# Patient Record
Sex: MALE | Race: WHITE | HISPANIC OR LATINO
[De-identification: names, ages, dates, MRNs, and addresses within clinical notes are randomized per-mention and may not be internally consistent; named-entity substitution may affect disease eponyms.]

---

## 2022-09-19 ENCOUNTER — APPOINTMENT (OUTPATIENT)
Dept: CARDIOLOGY | Facility: CLINIC | Age: 67
End: 2022-09-19

## 2022-09-23 ENCOUNTER — APPOINTMENT (OUTPATIENT)
Dept: CARDIOLOGY | Facility: CLINIC | Age: 67
End: 2022-09-23

## 2022-09-23 VITALS — BODY MASS INDEX: 31.85 KG/M2 | HEIGHT: 65 IN | WEIGHT: 191.13 LBS

## 2022-09-23 PROCEDURE — 99205 OFFICE O/P NEW HI 60 MIN: CPT

## 2022-09-23 NOTE — DISCUSSION/SUMMARY
[FreeTextEntry1] : pt hr is slow asymptomatic \par get monitor \par get echo \par stop lisinopril and increase valsartan.

## 2022-09-23 NOTE — HISTORY OF PRESENT ILLNESS
[FreeTextEntry1] : PT with HTN, HLD, HYPOTHYROID, PREDM, 10 YEAR ASCVD IS 29% IN 2022\par \par pt went ot see PMD and hr was bradycardic at times, pt was 178 and patient not symptomatic but hr to 43 bpm with normal pr interval \par ekg reviewed. \par pt was not dizzy or sob. \par pt f/u with dr watson in [place] and medications are changing. \par reviewed all meds, \par Lipid normal profile.

## 2022-09-28 ENCOUNTER — NON-APPOINTMENT (OUTPATIENT)
Age: 67
End: 2022-09-28

## 2022-09-28 DIAGNOSIS — Z82.3 FAMILY HISTORY OF STROKE: ICD-10-CM

## 2022-09-28 DIAGNOSIS — Z82.49 FAMILY HISTORY OF ISCHEMIC HEART DISEASE AND OTHER DISEASES OF THE CIRCULATORY SYSTEM: ICD-10-CM

## 2022-10-14 ENCOUNTER — APPOINTMENT (OUTPATIENT)
Dept: CARDIOLOGY | Facility: CLINIC | Age: 67
End: 2022-10-14

## 2022-10-14 PROCEDURE — 93306 TTE W/DOPPLER COMPLETE: CPT

## 2022-10-28 ENCOUNTER — APPOINTMENT (OUTPATIENT)
Dept: CARDIOLOGY | Facility: CLINIC | Age: 67
End: 2022-10-28

## 2022-10-28 VITALS — WEIGHT: 192.44 LBS | HEIGHT: 65 IN | BODY MASS INDEX: 32.06 KG/M2

## 2022-10-28 PROCEDURE — 99214 OFFICE O/P EST MOD 30 MIN: CPT

## 2022-10-28 RX ORDER — LISINOPRIL 10 MG/1
10 TABLET ORAL DAILY
Refills: 0 | Status: DISCONTINUED | COMMUNITY
End: 2022-10-28

## 2022-10-28 NOTE — HISTORY OF PRESENT ILLNESS
[FreeTextEntry1] : PT with HTN, HLD, HYPOTHYROID, PREDM, 10 YEAR ASCVD IS 29% IN 2022, borderline LVH \par \par pt went ot see PMD and hr was bradycardic at times, pt was 178 and patient not symptomatic but hr to 43 bpm with normal pr interval \par ekg reviewed. \par pt was not dizzy or sob. \par pt f/u with dr watson in [place] and medications are changing. \par reviewed all meds, \par Lipid normal profile. \par \par pt monitor average hr 62 bpm, pt says rarely dizzy but does not seem to be cardiac, pt had one episode of dizziness with monitor but no abn.

## 2022-10-28 NOTE — DISCUSSION/SUMMARY
[FreeTextEntry1] : pt hr is slow asymptomatic \par stop lisinopril and increase valsartan. to 160 \par \par pt bp controlled has some borderline lvh, salt restriction < 1500 mg daily \par f/u in 6 months pt advised to exercise \par get bloodwork \par HIGH 10 YEAR RISK WILL CALCULATE AT NEXT VISIT  none

## 2022-12-30 ENCOUNTER — NON-APPOINTMENT (OUTPATIENT)
Age: 67
End: 2022-12-30

## 2023-04-28 ENCOUNTER — APPOINTMENT (OUTPATIENT)
Dept: CARDIOLOGY | Facility: CLINIC | Age: 68
End: 2023-04-28
Payer: COMMERCIAL

## 2023-04-28 VITALS — HEART RATE: 80 BPM | SYSTOLIC BLOOD PRESSURE: 155 MMHG | DIASTOLIC BLOOD PRESSURE: 95 MMHG

## 2023-04-28 VITALS — BODY MASS INDEX: 32.49 KG/M2 | WEIGHT: 195 LBS | HEIGHT: 65 IN

## 2023-04-28 DIAGNOSIS — R07.89 OTHER CHEST PAIN: ICD-10-CM

## 2023-04-28 PROCEDURE — 99214 OFFICE O/P EST MOD 30 MIN: CPT

## 2023-04-28 RX ORDER — VALSARTAN 80 MG/1
80 TABLET, COATED ORAL
Refills: 0 | Status: DISCONTINUED | COMMUNITY
End: 2023-04-28

## 2023-04-28 RX ORDER — ROSUVASTATIN CALCIUM 20 MG/1
20 TABLET, FILM COATED ORAL DAILY
Qty: 90 | Refills: 3 | Status: DISCONTINUED | COMMUNITY
End: 2023-04-28

## 2023-04-28 RX ORDER — SIMVASTATIN 10 MG/1
10 TABLET, FILM COATED ORAL DAILY
Refills: 0 | Status: DISCONTINUED | COMMUNITY
End: 2023-04-28

## 2023-04-28 NOTE — PHYSICAL EXAM
[Normal] : normal S1, S2, no murmur, no rub, no gallop [Clear Lung Fields] : clear lung fields [Soft] : abdomen soft [No Edema] : no edema [de-identified] : rrr

## 2023-04-28 NOTE — HISTORY OF PRESENT ILLNESS
[FreeTextEntry1] : PT with HTN, HLD, HYPOTHYROID, PREDM, 10 YEAR ASCVD IS 29% IN 2022, borderline LVH DILATED AO 3.9 cm. \par \par pt went ot see PMD and hr was bradycardic at times, pt was 178 and patient not symptomatic but hr to 43 bpm with normal pr interval \par ekg reviewed. \par pt was not dizzy or sob. \par pt f/u with dr watson in [place] and medications are changing. \par reviewed all meds, \par Lipid normal profile. \par \par pt monitor average hr 62 bpm, pt says rarely dizzy but does not seem to be cardiac, pt had one episode of dizziness with monitor but no abn. \par \par 4/28/23: daughter translating. \par 10/14/22: lvef 60%, DD1, mild mr dilated aorta 3.9 CM BORDERLINE LVH \par pt complains of pain in his ankles and c/o of pain in middle thigh with lifting intermittently, pt denies claudication, pt c/o's of cp on left side intermittently for a few days and no sob, no n/v and diaphoresis. \par pt had bloodwork at  office will do before our next visit.pt says bp high at home.

## 2023-04-28 NOTE — DISCUSSION/SUMMARY
[FreeTextEntry1] : pt hr is slow asymptomatic \par stop lisinopril and increase valsartan. to 160 \par \par pt bp controlled has some borderline lvh, salt restriction < 1500 mg daily \par f/u in 6 months pt advised to exercise \par get bloodwork \par HIGH 10 YEAR RISK WILL CALCULATE AT NEXT VISIT \par \par 4/28/23: \par repeat bp is 138/94, pt does not have list of meds and needst to update \par will stop simvastatin \par increase hctz to 25 mg po qd \par cp will get stress nuclear as cannot exercise

## 2023-07-24 NOTE — PHYSICAL EXAM
[Normal] : normal S1, S2, no murmur, no rub, no gallop [Clear Lung Fields] : clear lung fields [Soft] : abdomen soft [No Edema] : no edema [de-identified] : rrr

## 2023-07-24 NOTE — HISTORY OF PRESENT ILLNESS
[FreeTextEntry1] : PT with HTN, HLD, HYPOTHYROID, PREDM, 10 YEAR ASCVD IS 29% IN 2022, borderline LVH DILATED AO 3.9 cm. \par \par pt went ot see PMD and hr was bradycardic at times, pt was 178 and patient not symptomatic but hr to 43 bpm with normal pr interval \par ekg reviewed. \par pt was not dizzy or sob. \par pt f/u with dr watson in [place] and medications are changing. \par reviewed all meds, \par Lipid normal profile. \par \par pt monitor average hr 62 bpm, pt says rarely dizzy but does not seem to be cardiac, pt had one episode of dizziness with monitor but no abn. \par \par 4/28/23: daughter translating. \par 10/14/22: lvef 60%, DD1, mild mr dilated aorta 3.9 CM BORDERLINE LVH \par pt complains of pain in his ankles and c/o of pain in middle thigh with lifting intermittently, pt denies claudication, pt c/o's of cp on left side intermittently for a few days and no sob, no n/v and diaphoresis. \par pt had bloodwork at  office will do before our next visit.pt says bp high at home. \par \par 7/25/23:\par stress nuclear?

## 2023-07-25 ENCOUNTER — APPOINTMENT (OUTPATIENT)
Dept: CARDIOLOGY | Facility: CLINIC | Age: 68
End: 2023-07-25

## 2024-03-07 PROBLEM — R42 DIZZINESS: Status: ACTIVE | Noted: 2022-09-23

## 2024-03-08 ENCOUNTER — APPOINTMENT (OUTPATIENT)
Dept: CARDIOLOGY | Facility: CLINIC | Age: 69
End: 2024-03-08
Payer: MEDICARE

## 2024-03-08 VITALS — WEIGHT: 196 LBS | HEIGHT: 65 IN | BODY MASS INDEX: 32.65 KG/M2

## 2024-03-08 VITALS — SYSTOLIC BLOOD PRESSURE: 150 MMHG | DIASTOLIC BLOOD PRESSURE: 100 MMHG

## 2024-03-08 DIAGNOSIS — R42 DIZZINESS AND GIDDINESS: ICD-10-CM

## 2024-03-08 DIAGNOSIS — Z00.00 ENCOUNTER FOR GENERAL ADULT MEDICAL EXAMINATION W/OUT ABNORMAL FINDINGS: ICD-10-CM

## 2024-03-08 PROCEDURE — 93000 ELECTROCARDIOGRAM COMPLETE: CPT

## 2024-03-08 PROCEDURE — 99214 OFFICE O/P EST MOD 30 MIN: CPT | Mod: 25

## 2024-03-08 RX ORDER — ROSUVASTATIN CALCIUM 40 MG/1
40 TABLET, FILM COATED ORAL
Refills: 0 | Status: ACTIVE | COMMUNITY
Start: 2024-03-08

## 2024-03-08 RX ORDER — OMEPRAZOLE 40 MG/1
40 CAPSULE, DELAYED RELEASE ORAL
Refills: 0 | Status: ACTIVE | COMMUNITY
Start: 2024-03-08

## 2024-03-08 RX ORDER — LEVOTHYROXINE SODIUM 0.05 MG/1
50 TABLET ORAL
Refills: 0 | Status: ACTIVE | COMMUNITY
Start: 2024-03-08

## 2024-03-08 RX ORDER — NIFEDIPINE 60 MG/1
60 TABLET, FILM COATED, EXTENDED RELEASE ORAL
Refills: 0 | Status: DISCONTINUED | COMMUNITY
End: 2024-03-08

## 2024-03-12 RX ORDER — NIFEDIPINE 60 MG/1
60 TABLET, EXTENDED RELEASE ORAL
Qty: 90 | Refills: 3 | Status: ACTIVE | COMMUNITY
Start: 2024-03-08 | End: 1900-01-01

## 2024-03-18 LAB
ALBUMIN SERPL ELPH-MCNC: 4.6 G/DL
ALP BLD-CCNC: 60 U/L
ALT SERPL-CCNC: 20 U/L
ANION GAP SERPL CALC-SCNC: 12 MMOL/L
AST SERPL-CCNC: 16 U/L
BILIRUB SERPL-MCNC: 0.6 MG/DL
BUN SERPL-MCNC: 18 MG/DL
CALCIUM SERPL-MCNC: 9.5 MG/DL
CHLORIDE SERPL-SCNC: 103 MMOL/L
CHOLEST SERPL-MCNC: 152 MG/DL
CO2 SERPL-SCNC: 25 MMOL/L
CREAT SERPL-MCNC: 1.14 MG/DL
EGFR: 71 ML/MIN/1.73M2
ESTIMATED AVERAGE GLUCOSE: 123 MG/DL
GLUCOSE SERPL-MCNC: 103 MG/DL
HBA1C MFR BLD HPLC: 5.9 %
HCT VFR BLD CALC: 45.1 %
HDLC SERPL-MCNC: 54 MG/DL
HGB BLD-MCNC: 14.6 G/DL
LDLC SERPL CALC-MCNC: 83 MG/DL
MCHC RBC-ENTMCNC: 28.6 PG
MCHC RBC-ENTMCNC: 32.4 GM/DL
MCV RBC AUTO: 88.3 FL
NONHDLC SERPL-MCNC: 98 MG/DL
NT-PROBNP SERPL-MCNC: 43 PG/ML
PLATELET # BLD AUTO: 239 K/UL
POTASSIUM SERPL-SCNC: 4.4 MMOL/L
PROT SERPL-MCNC: 6.7 G/DL
RBC # BLD: 5.11 M/UL
RBC # FLD: 14 %
SODIUM SERPL-SCNC: 140 MMOL/L
TRIGL SERPL-MCNC: 79 MG/DL
TSH SERPL-ACNC: 7.06 UIU/ML
WBC # FLD AUTO: 7.98 K/UL

## 2024-03-21 NOTE — PHYSICAL EXAM
[Normal] : normal S1, S2, no murmur, no rub, no gallop [Clear Lung Fields] : clear lung fields [Soft] : abdomen soft [No Edema] : no edema [de-identified] : rrr

## 2024-03-21 NOTE — CARDIOLOGY SUMMARY
[de-identified] : 3/8/24: sinus bradycardia with nsst changes.  [de-identified] : 10/14/22: lvef 60%, DD1, mild mr dilated aorta 3.9 CM BORDERLINE LVH

## 2024-03-21 NOTE — DISCUSSION/SUMMARY
[EKG obtained to assist in diagnosis and management of assessed problem(s)] : EKG obtained to assist in diagnosis and management of assessed problem(s) [FreeTextEntry1] : pt hr is slow asymptomatic  cont valsartan. to 160 2023 pt bp controlled has some borderline lvh, salt restriction < 1500 mg daily  HIGH 10 YEAR RISK WILL CALCULATE AT NEXT VISIT  2023 continue rousvastatin  stop hctz as frequent urination but may not be cause.  start nifedipine 90 mg  get 2 d echo  schedule CTA  f/u in 1 month get bloodwork

## 2024-03-21 NOTE — HISTORY OF PRESENT ILLNESS
[FreeTextEntry1] : PT with HTN, HLD, borderline LVH DILATED AO 3.9 cm. 10 YEAR ASCVD IS 29% , HYPOTHYROID, PREDM,  [facility] bradycardic   pt went ot see PMD and hr was bradycardic at times, pt was 178 and patient not symptomatic but hr to 43 bpm with normal pr interval  ekg reviewed.  pt was not dizzy or sob.  pt f/u with dr watson in [place] and medications are changing.  reviewed all meds,  Lipid normal profile.   pt monitor average hr 62 bpm, pt says rarely dizzy but does not seem to be cardiac, pt had one episode of dizziness with monitor but no abn.   23: daughter translating.  10/14/22: lvef 60%, DD1, mild mr dilated aorta 3.9 CM BORDERLINE LVH  pt complains of pain in his ankles and c/o of pain in middle thigh with lifting intermittently, pt denies claudication, pt c/o's of cp on left side intermittently for a few days and no sob, no n/v and diaphoresis.  pt had bloodwork at  office will do before our next visit.pt says bp high at home.   3/8/24: had chest pain intemittent  here after one year did not do stress test.  pt says has chest pressure lasting a short time 2 to 3 times/week. pt was suppose to get stress test and had insurance issues and went back to . reviewed all medications.  no lipids, stopped simvastatin last visit. and onr rosuvastatin  pt complains of frequent urination and has been on hctz for awhile. pt denies syncope/palpiations, pt works as a heavy .  pt says bp is ok at home.   3/12/24: Telephone call with NP: Patient's daughter called states patient started increased dose of Nifedipine on Saturday. On  patient was having dinner felt flushing/sweating and patient wasn't feeling well. On Monday patient went to work and felt the same after taking the medication. Patient advised to decrease medication back down to 60mg to see if symptoms improve.   24: Echo before appointment.  3/16/24: A1C: 5.9, LDL: 83, T, HDL: 54, BNP: 43

## 2024-04-10 ENCOUNTER — OUTPATIENT (OUTPATIENT)
Dept: OUTPATIENT SERVICES | Facility: HOSPITAL | Age: 69
LOS: 1 days | End: 2024-04-10
Payer: MEDICARE

## 2024-04-10 ENCOUNTER — TRANSCRIPTION ENCOUNTER (OUTPATIENT)
Age: 69
End: 2024-04-10

## 2024-04-10 ENCOUNTER — RESULT REVIEW (OUTPATIENT)
Age: 69
End: 2024-04-10

## 2024-04-10 DIAGNOSIS — Z00.8 ENCOUNTER FOR OTHER GENERAL EXAMINATION: ICD-10-CM

## 2024-04-10 DIAGNOSIS — E78.5 HYPERLIPIDEMIA, UNSPECIFIED: ICD-10-CM

## 2024-04-10 DIAGNOSIS — I10 ESSENTIAL (PRIMARY) HYPERTENSION: ICD-10-CM

## 2024-04-10 PROCEDURE — 75574 CT ANGIO HRT W/3D IMAGE: CPT

## 2024-04-10 PROCEDURE — 75574 CT ANGIO HRT W/3D IMAGE: CPT | Mod: 26

## 2024-04-10 PROCEDURE — 76856 US EXAM PELVIC COMPLETE: CPT

## 2024-04-10 PROCEDURE — 76856 US EXAM PELVIC COMPLETE: CPT | Mod: 26

## 2024-04-11 ENCOUNTER — OUTPATIENT (OUTPATIENT)
Dept: OUTPATIENT SERVICES | Facility: HOSPITAL | Age: 69
LOS: 1 days | End: 2024-04-11
Payer: MEDICARE

## 2024-04-11 DIAGNOSIS — I10 ESSENTIAL (PRIMARY) HYPERTENSION: ICD-10-CM

## 2024-04-11 DIAGNOSIS — E78.5 HYPERLIPIDEMIA, UNSPECIFIED: ICD-10-CM

## 2024-04-11 DIAGNOSIS — Z00.8 ENCOUNTER FOR OTHER GENERAL EXAMINATION: ICD-10-CM

## 2024-04-11 PROCEDURE — 75580 N-INVAS EST C FFR SW ALY CTA: CPT

## 2024-04-11 PROCEDURE — 75580 N-INVAS EST C FFR SW ALY CTA: CPT | Mod: 26

## 2024-04-12 ENCOUNTER — APPOINTMENT (OUTPATIENT)
Dept: CARDIOLOGY | Facility: CLINIC | Age: 69
End: 2024-04-12
Payer: MEDICARE

## 2024-04-12 VITALS — WEIGHT: 196 LBS | BODY MASS INDEX: 32.65 KG/M2 | HEIGHT: 65 IN

## 2024-04-12 DIAGNOSIS — I51.7 CARDIOMEGALY: ICD-10-CM

## 2024-04-12 DIAGNOSIS — I20.9 ANGINA PECTORIS, UNSPECIFIED: ICD-10-CM

## 2024-04-12 DIAGNOSIS — E03.9 HYPOTHYROIDISM, UNSPECIFIED: ICD-10-CM

## 2024-04-12 DIAGNOSIS — E11.21 TYPE 2 DIABETES MELLITUS WITH DIABETIC NEPHROPATHY: ICD-10-CM

## 2024-04-12 DIAGNOSIS — R00.1 BRADYCARDIA, UNSPECIFIED: ICD-10-CM

## 2024-04-12 DIAGNOSIS — I10 ESSENTIAL (PRIMARY) HYPERTENSION: ICD-10-CM

## 2024-04-12 DIAGNOSIS — E78.2 MIXED HYPERLIPIDEMIA: ICD-10-CM

## 2024-04-12 DIAGNOSIS — R93.1 ABNORMAL FINDINGS ON DIAGNOSTIC IMAGING OF HEART AND CORONARY CIRCULATION: ICD-10-CM

## 2024-04-12 PROCEDURE — 93306 TTE W/DOPPLER COMPLETE: CPT

## 2024-04-12 PROCEDURE — 99214 OFFICE O/P EST MOD 30 MIN: CPT | Mod: 25

## 2024-04-12 PROCEDURE — G2211 COMPLEX E/M VISIT ADD ON: CPT

## 2024-04-12 RX ORDER — HYDROCHLOROTHIAZIDE 25 MG/1
25 TABLET ORAL DAILY
Qty: 90 | Refills: 3 | Status: DISCONTINUED | COMMUNITY
End: 2024-04-12

## 2024-04-12 RX ORDER — METOPROLOL SUCCINATE 25 MG/1
25 TABLET, EXTENDED RELEASE ORAL DAILY
Qty: 90 | Refills: 3 | Status: ACTIVE | COMMUNITY
Start: 2024-04-12 | End: 1900-01-01

## 2024-04-12 RX ORDER — RANOLAZINE 500 MG/1
500 TABLET, EXTENDED RELEASE ORAL
Qty: 180 | Refills: 3 | Status: ACTIVE | COMMUNITY
Start: 2024-04-12 | End: 1900-01-01

## 2024-04-12 RX ORDER — METOPROLOL TARTRATE 100 MG/1
100 TABLET, FILM COATED ORAL
Qty: 2 | Refills: 0 | Status: DISCONTINUED | COMMUNITY
Start: 2024-03-08 | End: 2024-04-12

## 2024-04-12 RX ORDER — EZETIMIBE 10 MG/1
10 TABLET ORAL
Qty: 90 | Refills: 3 | Status: ACTIVE | COMMUNITY
Start: 2024-04-12 | End: 1900-01-01

## 2024-04-13 DIAGNOSIS — R93.1 ABNORMAL FINDINGS ON DIAGNOSTIC IMAGING OF HEART AND CORONARY CIRCULATION: ICD-10-CM

## 2024-05-09 ENCOUNTER — APPOINTMENT (OUTPATIENT)
Dept: NEPHROLOGY | Facility: CLINIC | Age: 69
End: 2024-05-09
Payer: MEDICARE

## 2024-05-09 VITALS
HEIGHT: 65 IN | TEMPERATURE: 98.4 F | OXYGEN SATURATION: 96 % | HEART RATE: 56 BPM | BODY MASS INDEX: 32.99 KG/M2 | SYSTOLIC BLOOD PRESSURE: 150 MMHG | DIASTOLIC BLOOD PRESSURE: 82 MMHG | WEIGHT: 198 LBS

## 2024-05-09 DIAGNOSIS — N28.9 DISORDER OF KIDNEY AND URETER, UNSPECIFIED: ICD-10-CM

## 2024-05-09 LAB
BILIRUB UR QL STRIP: NORMAL
CLARITY UR: CLEAR
COLLECTION METHOD: NORMAL
GLUCOSE UR-MCNC: NORMAL
HCG UR QL: 0.2 EU/DL
HGB UR QL STRIP.AUTO: NORMAL
KETONES UR-MCNC: NORMAL
LEUKOCYTE ESTERASE UR QL STRIP: NORMAL
NITRITE UR QL STRIP: NORMAL
PH UR STRIP: 6.5
PROT UR STRIP-MCNC: NORMAL
SP GR UR STRIP: 1.03

## 2024-05-09 PROCEDURE — 81003 URINALYSIS AUTO W/O SCOPE: CPT | Mod: QW

## 2024-05-09 PROCEDURE — 99204 OFFICE O/P NEW MOD 45 MIN: CPT

## 2024-05-09 RX ORDER — TAMSULOSIN HYDROCHLORIDE 0.4 MG/1
0.4 CAPSULE ORAL
Refills: 0 | Status: ACTIVE | COMMUNITY

## 2024-05-09 RX ORDER — ASPIRIN ENTERIC COATED TABLETS 81 MG 81 MG/1
81 TABLET, DELAYED RELEASE ORAL
Refills: 0 | Status: ACTIVE | COMMUNITY

## 2024-05-09 NOTE — PHYSICAL EXAM
[General Appearance - Alert] : alert [General Appearance - In No Acute Distress] : in no acute distress [Sclera] : the sclera and conjunctiva were normal [PERRL With Normal Accommodation] : pupils were equal in size, round, and reactive to light [Neck Appearance] : the appearance of the neck was normal [Extraocular Movements] : extraocular movements were intact [Neck Cervical Mass (___cm)] : no neck mass was observed [Jugular Venous Distention Increased] : there was no jugular-venous distention [Thyroid Diffuse Enlargement] : the thyroid was not enlarged [Thyroid Nodule] : there were no palpable thyroid nodules [Auscultation Breath Sounds / Voice Sounds] : lungs were clear to auscultation bilaterally [Heart Rate And Rhythm] : heart rate was normal and rhythm regular [Heart Sounds] : normal S1 and S2 [Heart Sounds Gallop] : no gallops [Murmurs] : no murmurs [Heart Sounds Pericardial Friction Rub] : no pericardial rub [Full Pulse] : the pedal pulses are present [Edema] : there was no peripheral edema [Bowel Sounds] : normal bowel sounds [Abdomen Soft] : soft [Abdomen Tenderness] : non-tender [Abdomen Mass (___ Cm)] : no abdominal mass palpated [No CVA Tenderness] : no ~M costovertebral angle tenderness [No Spinal Tenderness] : no spinal tenderness [Abnormal Walk] : normal gait [Nail Clubbing] : no clubbing  or cyanosis of the fingernails [Musculoskeletal - Swelling] : no joint swelling seen [Skin Color & Pigmentation] : normal skin color and pigmentation [Motor Tone] : muscle strength and tone were normal [Skin Turgor] : normal skin turgor [] : no rash [Motor Exam] : the motor exam was normal [No Focal Deficits] : no focal deficits [Oriented To Time, Place, And Person] : oriented to person, place, and time [Impaired Insight] : insight and judgment were intact [Affect] : the affect was normal

## 2024-05-10 LAB
ALBUMIN SERPL ELPH-MCNC: 4.5 G/DL
ALP BLD-CCNC: 64 U/L
ALT SERPL-CCNC: 19 U/L
ANION GAP SERPL CALC-SCNC: 13 MMOL/L
AST SERPL-CCNC: 20 U/L
BILIRUB SERPL-MCNC: 0.6 MG/DL
BUN SERPL-MCNC: 19 MG/DL
CALCIUM SERPL-MCNC: 9.1 MG/DL
CHLORIDE SERPL-SCNC: 106 MMOL/L
CHOLEST SERPL-MCNC: 112 MG/DL
CO2 SERPL-SCNC: 23 MMOL/L
CREAT SERPL-MCNC: 1.34 MG/DL
EGFR: 58 ML/MIN/1.73M2
ESTIMATED AVERAGE GLUCOSE: 123 MG/DL
GLUCOSE SERPL-MCNC: 95 MG/DL
HBA1C MFR BLD HPLC: 5.9 %
HCT VFR BLD CALC: 41.6 %
HDLC SERPL-MCNC: 53 MG/DL
HGB BLD-MCNC: 13.4 G/DL
LDLC SERPL CALC-MCNC: 45 MG/DL
MCHC RBC-ENTMCNC: 28.1 PG
MCHC RBC-ENTMCNC: 32.2 G/DL
MCV RBC AUTO: 87.2 FL
NONHDLC SERPL-MCNC: 60 MG/DL
NT-PROBNP SERPL-MCNC: 81 PG/ML
PLATELET # BLD AUTO: 214 K/UL
PMV BLD AUTO: 0 /100 WBCS
PMV BLD: 11.7 FL
POTASSIUM SERPL-SCNC: 4.6 MMOL/L
PROT SERPL-MCNC: 6.8 G/DL
RBC # BLD: 4.77 M/UL
RBC # FLD: 14.5 %
SODIUM SERPL-SCNC: 142 MMOL/L
TRIGL SERPL-MCNC: 70 MG/DL
TSH SERPL-ACNC: 7.74 UIU/ML
WBC # FLD AUTO: 7.75 K/UL

## 2024-05-14 NOTE — DISCUSSION/SUMMARY
[FreeTextEntry1] : pt hr is slow asymptomatic  cont valsartan. to 160 2023 pt bp controlled has some borderline lvh, salt restriction < 1500 mg daily  CTA 2024: 69% LAD, 50% LM GDMT for now  unless symptoms. pt tiredness in arms probably not anginal  but will see if improves with ranolazine 500 mg po q12  start metoprolol er 25  continue nifedipine 60 mg po qd  start aspirin 81 mg  continue rousvastatin 40 mg po qhs  start zetia 10  stop hctz as frequent urination but may not be cause 2024  get carotid Patient is CV stable for endoscopy patient can hold aspirin 7 days prior.

## 2024-05-14 NOTE — PHYSICAL EXAM
[Normal] : normal S1, S2, no murmur, no rub, no gallop [Clear Lung Fields] : clear lung fields [Soft] : abdomen soft [No Edema] : no edema [de-identified] : rrr

## 2024-05-14 NOTE — CARDIOLOGY SUMMARY
[de-identified] : 3/8/24: sinus bradycardia with nsst changes.  [de-identified] : 10/14/22: lvef 60%, DD1, mild mr dilated aorta 3.9 CM BORDERLINE LVH

## 2024-05-14 NOTE — HISTORY OF PRESENT ILLNESS
[FreeTextEntry1] : PT with 4/10/24: CTA CAC: 196 pLAD/mLAD: 69%, LM: 50% stenosis,  HTN, HLD, borderline LVH DILATED AO 4 cm. 10 YEAR ASCVD IS 29% IN , HYPOTHYROID, PREDM,  [facility] bradycardic   pt went ot see PMD and hr was bradycardic at times, pt hr at times to 178 and patient not symptomatic but hr to 43 bpm with normal pr interval  ekg reviewed.  pt was not dizzy or sob.  pt f/u with dr watson in [place] and medications are changing.  reviewed all meds,  Lipid normal profile.   pt monitor average hr 62 bpm, pt says rarely dizzy but does not seem to be cardiac, pt had one episode of dizziness with monitor but no abn.   23: daughter translating.  10/14/22: lvef 60%, DD1, mild mr dilated aorta 3.9 CM BORDERLINE LVH  pt complains of pain in his ankles and c/o of pain in middle thigh with lifting intermittently, pt denies claudication, pt c/o's of cp on left side intermittently for a few days and no sob, no n/v and diaphoresis.  pt had bloodwork at  office will do before our next visit.pt says bp high at home.   3/8/24: had chest pain intermittent  here after one year did not do stress test.  pt says has chest pressure lasting a short time 2 to 3 times/week. pt was suppose to get stress test and had insurance issues and went back to [place]. reviewed all medications.  no lipids, stopped simvastatin last visit. and on rosuvastatin  pt complains of frequent urination and has been on hctz for awhile. pt denies syncope/palpiations, pt works as a heavy .  pt says bp is ok at home.   3/12/24: Telephone call with NP: Patient's daughter called states patient started increased dose of Nifedipine on Saturday. On  patient was having dinner felt flushing/sweating and patient wasn't feeling well. On Monday patient went to work and felt the same after taking the medication. Patient advised to decrease medication back down to 60mg to see if symptoms improve.   24: Echo: lvef 63%, DD1, LVOT VTI: 24 cm  3/16/24: A1C: 5.9, LDL: 83, T, HDL: 54, BNP: 43 bun/cr: 18/1.14 GFR: 71  as above issue with nfiedipine 90 mg WITH FLUSHING.  pt doing ok at 60 mg nifedipine.  4/10/24: CTA CAC: 196 pLAD/mLAD: 69%, LM: 50% stenosis  pt says chest pressure now rare on omeprazole and attributes to gas. pt says gas like symptoms bothers him at times and gets tiredness on arms and burps and goes away.  pt not very active. pt says does not sob with working but not very active. pt says gets tiredness in his arms with walking.   24: Patient's daughter called office stating patient's kidney function decreased. Will refer to Dr. Doyle.   8/15/24:  24: GFR: 58, BNP: 81, HDL: 53, T, LDL: 45, A1C: 5.9, TSH: 7.74

## 2024-05-29 ENCOUNTER — OUTPATIENT (OUTPATIENT)
Dept: OUTPATIENT SERVICES | Facility: HOSPITAL | Age: 69
LOS: 1 days | End: 2024-05-29
Payer: MEDICARE

## 2024-05-29 ENCOUNTER — RESULT REVIEW (OUTPATIENT)
Age: 69
End: 2024-05-29

## 2024-05-29 DIAGNOSIS — Z00.8 ENCOUNTER FOR OTHER GENERAL EXAMINATION: ICD-10-CM

## 2024-05-29 DIAGNOSIS — N28.9 DISORDER OF KIDNEY AND URETER, UNSPECIFIED: ICD-10-CM

## 2024-05-29 LAB
ALBUMIN SERPL ELPH-MCNC: 4.7 G/DL
ALP BLD-CCNC: 64 U/L
ALT SERPL-CCNC: 22 U/L
ANION GAP SERPL CALC-SCNC: 12 MMOL/L
AST SERPL-CCNC: 20 U/L
BILIRUB SERPL-MCNC: 0.6 MG/DL
BUN SERPL-MCNC: 19 MG/DL
CALCIUM SERPL-MCNC: 9.4 MG/DL
CHLORIDE SERPL-SCNC: 103 MMOL/L
CHOLEST SERPL-MCNC: 118 MG/DL
CO2 SERPL-SCNC: 25 MMOL/L
CREAT SERPL-MCNC: 1.1 MG/DL
EGFR: 74 ML/MIN/1.73M2
GLUCOSE SERPL-MCNC: 101 MG/DL
HCT VFR BLD CALC: 43.5 %
HDLC SERPL-MCNC: 49 MG/DL
HGB BLD-MCNC: 14.3 G/DL
LDLC SERPL CALC-MCNC: 56 MG/DL
MCHC RBC-ENTMCNC: 29.2 PG
MCHC RBC-ENTMCNC: 32.9 G/DL
MCV RBC AUTO: 88.8 FL
NONHDLC SERPL-MCNC: 69 MG/DL
PHOSPHATE SERPL-MCNC: 2.7 MG/DL
PLATELET # BLD AUTO: 198 K/UL
PMV BLD AUTO: 0 /100 WBCS
PMV BLD: 12.1 FL
POTASSIUM SERPL-SCNC: 4.3 MMOL/L
PROT SERPL-MCNC: 6.9 G/DL
RBC # BLD: 4.9 M/UL
RBC # FLD: 14.6 %
SODIUM SERPL-SCNC: 140 MMOL/L
TRIGL SERPL-MCNC: 64 MG/DL
WBC # FLD AUTO: 7.75 K/UL

## 2024-05-29 PROCEDURE — 76770 US EXAM ABDO BACK WALL COMP: CPT | Mod: 26

## 2024-05-29 PROCEDURE — 76770 US EXAM ABDO BACK WALL COMP: CPT

## 2024-05-30 DIAGNOSIS — N28.9 DISORDER OF KIDNEY AND URETER, UNSPECIFIED: ICD-10-CM

## 2024-05-30 LAB
25(OH)D3 SERPL-MCNC: 38 NG/ML
CYSTATIN C SERPL-MCNC: 0.82 MG/L
GFR/BSA.PRED SERPLBLD CYS-BASED-ARV: 99 ML/MIN/1.73M2
PSA SERPL-MCNC: 0.74 NG/ML

## 2024-06-14 ENCOUNTER — APPOINTMENT (OUTPATIENT)
Dept: NEPHROLOGY | Facility: CLINIC | Age: 69
End: 2024-06-14
Payer: MEDICARE

## 2024-06-14 VITALS
TEMPERATURE: 97.9 F | WEIGHT: 180 LBS | HEIGHT: 65 IN | DIASTOLIC BLOOD PRESSURE: 70 MMHG | OXYGEN SATURATION: 98 % | BODY MASS INDEX: 29.99 KG/M2 | HEART RATE: 61 BPM | SYSTOLIC BLOOD PRESSURE: 112 MMHG

## 2024-06-14 LAB
BILIRUB UR QL STRIP: NORMAL
CLARITY UR: CLEAR
COLLECTION METHOD: NORMAL
GLUCOSE UR-MCNC: NORMAL
HCG UR QL: 0.2 EU/DL
HGB UR QL STRIP.AUTO: NORMAL
KETONES UR-MCNC: NORMAL
LEUKOCYTE ESTERASE UR QL STRIP: NORMAL
NITRITE UR QL STRIP: NORMAL
PH UR STRIP: 7
PROT UR STRIP-MCNC: NORMAL
SP GR UR STRIP: 1.01

## 2024-06-14 PROCEDURE — 99214 OFFICE O/P EST MOD 30 MIN: CPT

## 2024-06-14 PROCEDURE — 81003 URINALYSIS AUTO W/O SCOPE: CPT | Mod: QW

## 2024-06-14 RX ORDER — COLD-HOT PACK
EACH MISCELLANEOUS
Refills: 0 | Status: ACTIVE | COMMUNITY

## 2024-06-14 NOTE — PHYSICAL EXAM
[General Appearance - Alert] : alert [General Appearance - In No Acute Distress] : in no acute distress [Sclera] : the sclera and conjunctiva were normal [PERRL With Normal Accommodation] : pupils were equal in size, round, and reactive to light [Extraocular Movements] : extraocular movements were intact [Outer Ear] : the ears and nose were normal in appearance [Oropharynx] : the oropharynx was normal [Neck Appearance] : the appearance of the neck was normal [Neck Cervical Mass (___cm)] : no neck mass was observed [Jugular Venous Distention Increased] : there was no jugular-venous distention [Thyroid Diffuse Enlargement] : the thyroid was not enlarged [Thyroid Nodule] : there were no palpable thyroid nodules [Auscultation Breath Sounds / Voice Sounds] : lungs were clear to auscultation bilaterally [Heart Rate And Rhythm] : heart rate was normal and rhythm regular [Heart Sounds] : normal S1 and S2 [Heart Sounds Gallop] : no gallops [Heart Sounds Pericardial Friction Rub] : no pericardial rub [Full Pulse] : the pedal pulses are present [Edema] : there was no peripheral edema [Bowel Sounds] : normal bowel sounds [Abdomen Soft] : soft [Abdomen Tenderness] : non-tender [Abdomen Mass (___ Cm)] : no abdominal mass palpated [No CVA Tenderness] : no ~M costovertebral angle tenderness [No Spinal Tenderness] : no spinal tenderness [Abnormal Walk] : normal gait [Nail Clubbing] : no clubbing  or cyanosis of the fingernails [Musculoskeletal - Swelling] : no joint swelling seen [Motor Tone] : muscle strength and tone were normal [Skin Color & Pigmentation] : normal skin color and pigmentation [Skin Turgor] : normal skin turgor [] : no rash [Motor Exam] : the motor exam was normal [No Focal Deficits] : no focal deficits [Oriented To Time, Place, And Person] : oriented to person, place, and time [Impaired Insight] : insight and judgment were intact [Affect] : the affect was normal [FreeTextEntry1] : soft 2/6 AGGIE over 2nd right ICS

## 2024-06-14 NOTE — ASSESSMENT
[FreeTextEntry1] : #) CKD stage G2A1:   - will continue to monitor progression of renal dysfunction.  The most recent SCr is 1.1 mg/dL with an eGFR of 74 ml/min, which is stable compared to prior reading of SCr 1.34 mg/dL and eGFR 53 ml/min.  - etiology of underlying CKD is most likely 2/2 HTN nephrosclerosis vs renovascular disease  - will monitor acid base; no bicarbonate supplementation is required at current HCO3  - will monitor Vit D, Phos and PTH for mineral bone disease  - will monitor urine studies for proteinuria with U/A and urine protein quantification studies  - will monitor and treat hypercholesterolemia to goal LDL <100   #) Essential HTN:  BP controlled in clinic today.  - advised salt restrictive diet of <2.4gm daily  - continue to monitor home BP readings and report in future clinic appointments  - continue use of Metoprolol and Nifedipine as ordered  #) Dyslipidemia:  LDL Controlled and TriG wnl.  - Continue use of Crestor as ordered  #) NIDDM:  HbA1c controlled with diet  #) BPH PVR <150cc on Renal US, with minimal prostatic enlargement (31 cc) - continue Tamsulosin as ordered

## 2024-08-05 ENCOUNTER — RX RENEWAL (OUTPATIENT)
Age: 69
End: 2024-08-05

## 2024-08-07 ENCOUNTER — APPOINTMENT (OUTPATIENT)
Dept: CARDIOLOGY | Facility: CLINIC | Age: 69
End: 2024-08-07

## 2024-08-07 PROCEDURE — 93880 EXTRACRANIAL BILAT STUDY: CPT

## 2024-08-29 ENCOUNTER — APPOINTMENT (OUTPATIENT)
Dept: CARDIOLOGY | Facility: CLINIC | Age: 69
End: 2024-08-29
Payer: MEDICARE

## 2024-08-29 VITALS — WEIGHT: 184 LBS | BODY MASS INDEX: 30.66 KG/M2 | HEIGHT: 65 IN

## 2024-08-29 VITALS — SYSTOLIC BLOOD PRESSURE: 122 MMHG | DIASTOLIC BLOOD PRESSURE: 68 MMHG | HEART RATE: 61 BPM

## 2024-08-29 DIAGNOSIS — R00.1 BRADYCARDIA, UNSPECIFIED: ICD-10-CM

## 2024-08-29 DIAGNOSIS — E78.2 MIXED HYPERLIPIDEMIA: ICD-10-CM

## 2024-08-29 DIAGNOSIS — I20.9 ANGINA PECTORIS, UNSPECIFIED: ICD-10-CM

## 2024-08-29 DIAGNOSIS — I10 ESSENTIAL (PRIMARY) HYPERTENSION: ICD-10-CM

## 2024-08-29 DIAGNOSIS — E03.9 HYPOTHYROIDISM, UNSPECIFIED: ICD-10-CM

## 2024-08-29 DIAGNOSIS — I51.7 CARDIOMEGALY: ICD-10-CM

## 2024-08-29 DIAGNOSIS — E11.21 TYPE 2 DIABETES MELLITUS WITH DIABETIC NEPHROPATHY: ICD-10-CM

## 2024-08-29 PROCEDURE — 99214 OFFICE O/P EST MOD 30 MIN: CPT

## 2024-08-29 PROCEDURE — G2211 COMPLEX E/M VISIT ADD ON: CPT

## 2024-08-29 NOTE — PHYSICAL EXAM
[Normal] : normal S1, S2, no murmur, no rub, no gallop [Clear Lung Fields] : clear lung fields [Soft] : abdomen soft [No Edema] : no edema [de-identified] : rrr

## 2024-08-29 NOTE — DISCUSSION/SUMMARY
[FreeTextEntry1] : pt hr is slow asymptomatic  cont valsartan. to 160 2023 pt bp controlled has some borderline lvh, salt restriction < 1500 mg daily  CTA 2024: 69% LAD, 50% LM GDMT for now  unless symptoms. pt tiredness in arms probably not anginal  but will see if improves with ranolazine 500 mg po q12  continue nifedipine 60 mg po qd  start aspirin 81 mg  continue rousvastatin 40 mg po qhs  continue zetia 10  STOP METOPROLOL f/u TSH with pmd for tiredness.  bloodwork/ f/u in 6 months

## 2024-08-29 NOTE — CARDIOLOGY SUMMARY
[de-identified] : 3/8/24: sinus bradycardia with nsst changes.  [de-identified] : 10/14/22: lvef 60%, DD1, mild mr dilated aorta 3.9 CM BORDERLINE LVH

## 2024-08-29 NOTE — CARDIOLOGY SUMMARY
[de-identified] : 3/8/24: sinus bradycardia with nsst changes.  [de-identified] : 10/14/22: lvef 60%, DD1, mild mr dilated aorta 3.9 CM BORDERLINE LVH

## 2024-08-29 NOTE — HISTORY OF PRESENT ILLNESS
[FreeTextEntry1] : PT with 4/10/24: CTA CAC: 196 pLAD/mLAD: 69%, LM: 50% stenosis,  HTN, HLD, borderline LVH DILATED AO 4 cm. 10 YEAR ASCVD IS 29% IN , HYPOTHYROID, PREDM,  [facility] bradycardic   pt went ot see PMD and hr was bradycardic at times, pt hr at times to 178 and patient not symptomatic but hr to 43 bpm with normal pr interval  ekg reviewed.  pt was not dizzy or sob.  pt f/u with dr watson in [place] and medications are changing.  reviewed all meds,  Lipid normal profile.   pt monitor average hr 62 bpm, pt says rarely dizzy but does not seem to be cardiac, pt had one episode of dizziness with monitor but no abn.   23: daughter translating.  10/14/22: lvef 60%, DD1, mild mr dilated aorta 3.9 CM BORDERLINE LVH  pt complains of pain in his ankles and c/o of pain in middle thigh with lifting intermittently, pt denies claudication, pt c/o's of cp on left side intermittently for a few days and no sob, no n/v and diaphoresis.  pt had bloodwork at  office will do before our next visit.pt says bp high at home.   3/8/24: had chest pain intermittent  here after one year did not do stress test.  pt says has chest pressure lasting a short time 2 to 3 times/week. pt was suppose to get stress test and had insurance issues and went back to [place]. reviewed all medications.  no lipids, stopped simvastatin last visit. and on rosuvastatin  pt complains of frequent urination and has been on hctz for awhile. pt denies syncope/palpiations, pt works as a heavy .  pt says bp is ok at home.   3/12/24: Telephone call with NP: Patient's daughter called states patient started increased dose of Nifedipine on Saturday. On  patient was having dinner felt flushing/sweating and patient wasn't feeling well. On Monday patient went to work and felt the same after taking the medication. Patient advised to decrease medication back down to 60mg to see if symptoms improve.   24: Echo: lvef 63%, DD1, LVOT VTI: 24 cm  3/16/24: A1C: 5.9, LDL: 83, T, HDL: 54, BNP: 43 bun/cr: 18/1.14 GFR: 71  as above issue with nfiedipine 90 mg WITH FLUSHING.  pt doing ok at 60 mg nifedipine.  4/10/24: CTA CAC: 196 pLAD/mLAD: 69%, LM: 50% stenosis  pt says chest pressure now rare on omeprazole and attributes to gas. pt says gas like symptoms bothers him at times and gets tiredness on arms and burps and goes away.  pt not very active. pt says does not sob with working but not very active. pt says gets tiredness in his arms with walking.   8/15/24:  24: Patient's daughter called office stating patient's kidney function decreased. Will refer to Dr. Mandel.  24: GFR: 58, BNP: 81, HDL: 53, T, LDL: 45 improved,  A1C: 5.9 unchanged, TSH: 7.74 elevated f/u PMD  appreciate renal dr. mandel note, ckd 2a due to htn nephrosclerosis vs renovasculara dz will be monitored.  pt palpitations improved on metoprolol but patient has been feeling tired and fatigued.  pt has chronic tiredness since started metoprolol. pt denies cp or sob.  CAROTID: less than 50%, mild b/l ica.

## 2024-08-29 NOTE — PHYSICAL EXAM
[Normal] : normal S1, S2, no murmur, no rub, no gallop [Clear Lung Fields] : clear lung fields [Soft] : abdomen soft [No Edema] : no edema [de-identified] : rrr

## 2024-10-14 DIAGNOSIS — E11.21 TYPE 2 DIABETES MELLITUS WITH DIABETIC NEPHROPATHY: ICD-10-CM

## 2024-10-14 DIAGNOSIS — N28.9 DISORDER OF KIDNEY AND URETER, UNSPECIFIED: ICD-10-CM

## 2025-01-27 ENCOUNTER — APPOINTMENT (OUTPATIENT)
Dept: NEPHROLOGY | Facility: CLINIC | Age: 70
End: 2025-01-27
Payer: MEDICARE

## 2025-01-27 VITALS
HEIGHT: 65 IN | SYSTOLIC BLOOD PRESSURE: 140 MMHG | DIASTOLIC BLOOD PRESSURE: 90 MMHG | WEIGHT: 176 LBS | HEART RATE: 57 BPM | BODY MASS INDEX: 29.32 KG/M2 | OXYGEN SATURATION: 98 %

## 2025-01-27 DIAGNOSIS — N18.2 CHRONIC KIDNEY DISEASE, STAGE 2 (MILD): ICD-10-CM

## 2025-01-27 PROCEDURE — 99214 OFFICE O/P EST MOD 30 MIN: CPT

## 2025-02-21 ENCOUNTER — NON-APPOINTMENT (OUTPATIENT)
Age: 70
End: 2025-02-21

## 2025-02-21 ENCOUNTER — APPOINTMENT (OUTPATIENT)
Dept: CARDIOLOGY | Facility: CLINIC | Age: 70
End: 2025-02-21
Payer: MEDICARE

## 2025-02-21 VITALS
SYSTOLIC BLOOD PRESSURE: 130 MMHG | WEIGHT: 178 LBS | RESPIRATION RATE: 14 BRPM | HEIGHT: 65 IN | DIASTOLIC BLOOD PRESSURE: 80 MMHG | HEART RATE: 60 BPM | OXYGEN SATURATION: 97 % | BODY MASS INDEX: 29.66 KG/M2

## 2025-02-21 DIAGNOSIS — E03.9 HYPOTHYROIDISM, UNSPECIFIED: ICD-10-CM

## 2025-02-21 DIAGNOSIS — R00.1 BRADYCARDIA, UNSPECIFIED: ICD-10-CM

## 2025-02-21 DIAGNOSIS — I20.9 ANGINA PECTORIS, UNSPECIFIED: ICD-10-CM

## 2025-02-21 DIAGNOSIS — N18.2 CHRONIC KIDNEY DISEASE, STAGE 2 (MILD): ICD-10-CM

## 2025-02-21 DIAGNOSIS — I51.7 CARDIOMEGALY: ICD-10-CM

## 2025-02-21 DIAGNOSIS — I10 ESSENTIAL (PRIMARY) HYPERTENSION: ICD-10-CM

## 2025-02-21 DIAGNOSIS — R09.81 NASAL CONGESTION: ICD-10-CM

## 2025-02-21 DIAGNOSIS — E78.2 MIXED HYPERLIPIDEMIA: ICD-10-CM

## 2025-02-21 PROCEDURE — 93000 ELECTROCARDIOGRAM COMPLETE: CPT

## 2025-02-21 PROCEDURE — G2211 COMPLEX E/M VISIT ADD ON: CPT

## 2025-02-21 PROCEDURE — 99214 OFFICE O/P EST MOD 30 MIN: CPT

## 2025-03-26 ENCOUNTER — APPOINTMENT (OUTPATIENT)
Dept: OTOLARYNGOLOGY | Facility: CLINIC | Age: 70
End: 2025-03-26

## 2025-03-26 VITALS — WEIGHT: 181 LBS | BODY MASS INDEX: 30.16 KG/M2 | HEIGHT: 65 IN

## 2025-03-26 DIAGNOSIS — R09.81 NASAL CONGESTION: ICD-10-CM

## 2025-03-26 DIAGNOSIS — H61.23 IMPACTED CERUMEN, BILATERAL: ICD-10-CM

## 2025-03-26 PROCEDURE — 99204 OFFICE O/P NEW MOD 45 MIN: CPT | Mod: 25

## 2025-03-26 PROCEDURE — 31231 NASAL ENDOSCOPY DX: CPT

## 2025-03-26 PROCEDURE — 69210 REMOVE IMPACTED EAR WAX UNI: CPT

## 2025-03-26 RX ORDER — FLUTICASONE PROPIONATE 50 UG/1
50 SPRAY, METERED NASAL
Qty: 3 | Refills: 0 | Status: ACTIVE | COMMUNITY
Start: 2025-03-26 | End: 1900-01-01

## 2025-04-23 ENCOUNTER — APPOINTMENT (OUTPATIENT)
Dept: OTOLARYNGOLOGY | Facility: CLINIC | Age: 70
End: 2025-04-23

## 2025-04-30 ENCOUNTER — LABORATORY RESULT (OUTPATIENT)
Age: 70
End: 2025-04-30

## 2025-08-04 ENCOUNTER — APPOINTMENT (OUTPATIENT)
Dept: CARDIOLOGY | Facility: CLINIC | Age: 70
End: 2025-08-04
Payer: MEDICARE

## 2025-08-04 PROCEDURE — 93306 TTE W/DOPPLER COMPLETE: CPT

## 2025-08-15 ENCOUNTER — APPOINTMENT (OUTPATIENT)
Dept: NEPHROLOGY | Facility: CLINIC | Age: 70
End: 2025-08-15
Payer: MEDICARE

## 2025-08-15 VITALS
BODY MASS INDEX: 32.65 KG/M2 | WEIGHT: 196 LBS | SYSTOLIC BLOOD PRESSURE: 110 MMHG | HEART RATE: 66 BPM | OXYGEN SATURATION: 96 % | HEIGHT: 65 IN | DIASTOLIC BLOOD PRESSURE: 68 MMHG

## 2025-08-15 PROCEDURE — 99214 OFFICE O/P EST MOD 30 MIN: CPT

## 2025-08-18 PROBLEM — I77.810 ASCENDING AORTA DILATION: Status: ACTIVE | Noted: 2025-08-18

## 2025-08-19 ENCOUNTER — APPOINTMENT (OUTPATIENT)
Dept: CARDIOLOGY | Facility: CLINIC | Age: 70
End: 2025-08-19
Payer: MEDICARE

## 2025-08-19 VITALS — BODY MASS INDEX: 31.62 KG/M2 | WEIGHT: 190 LBS | SYSTOLIC BLOOD PRESSURE: 118 MMHG | DIASTOLIC BLOOD PRESSURE: 80 MMHG

## 2025-08-19 DIAGNOSIS — N18.2 CHRONIC KIDNEY DISEASE, STAGE 2 (MILD): ICD-10-CM

## 2025-08-19 DIAGNOSIS — I10 ESSENTIAL (PRIMARY) HYPERTENSION: ICD-10-CM

## 2025-08-19 DIAGNOSIS — I77.810 THORACIC AORTIC ECTASIA: ICD-10-CM

## 2025-08-19 DIAGNOSIS — I51.7 CARDIOMEGALY: ICD-10-CM

## 2025-08-19 DIAGNOSIS — E11.21 TYPE 2 DIABETES MELLITUS WITH DIABETIC NEPHROPATHY: ICD-10-CM

## 2025-08-19 DIAGNOSIS — E78.2 MIXED HYPERLIPIDEMIA: ICD-10-CM

## 2025-08-19 DIAGNOSIS — R00.1 BRADYCARDIA, UNSPECIFIED: ICD-10-CM

## 2025-08-19 DIAGNOSIS — I20.9 ANGINA PECTORIS, UNSPECIFIED: ICD-10-CM

## 2025-08-19 PROCEDURE — G2211 COMPLEX E/M VISIT ADD ON: CPT

## 2025-08-19 PROCEDURE — 99214 OFFICE O/P EST MOD 30 MIN: CPT

## 2025-08-19 RX ORDER — HYDROCHLOROTHIAZIDE 25 MG/1
25 TABLET ORAL
Refills: 0 | Status: ACTIVE | COMMUNITY